# Patient Record
Sex: MALE | Race: WHITE | URBAN - METROPOLITAN AREA
[De-identification: names, ages, dates, MRNs, and addresses within clinical notes are randomized per-mention and may not be internally consistent; named-entity substitution may affect disease eponyms.]

---

## 2018-12-06 ENCOUNTER — IMPORTED ENCOUNTER (OUTPATIENT)
Dept: URBAN - METROPOLITAN AREA CLINIC 1 | Facility: CLINIC | Age: 68
End: 2018-12-06

## 2018-12-06 PROBLEM — H52.4: Noted: 2018-12-06

## 2018-12-06 PROCEDURE — S0620 ROUTINE OPHTHALMOLOGICAL EXA: HCPCS

## 2018-12-06 NOTE — PATIENT DISCUSSION
1.  Presbyopia: Rx was given for corrective spectacles if indicated. 2.  Cataract OUReturn for an appointment in 1 year 36 with Dr. Zahra Powell.

## 2019-01-14 ENCOUNTER — IMPORTED ENCOUNTER (OUTPATIENT)
Dept: URBAN - METROPOLITAN AREA CLINIC 1 | Facility: CLINIC | Age: 69
End: 2019-01-14

## 2019-11-19 ENCOUNTER — IMPORTED ENCOUNTER (OUTPATIENT)
Dept: URBAN - METROPOLITAN AREA CLINIC 1 | Facility: CLINIC | Age: 69
End: 2019-11-19

## 2019-11-19 PROBLEM — H52.4: Noted: 2019-11-19

## 2019-11-19 PROBLEM — H52.223: Noted: 2019-11-19

## 2019-11-19 PROBLEM — H52.13: Noted: 2019-11-19

## 2019-11-19 PROCEDURE — S0621 ROUTINE OPHTHALMOLOGICAL EXA: HCPCS

## 2019-11-19 NOTE — PATIENT DISCUSSION
1. Myopia / Astigmatism / Presbyopia OU -- Finalized Glasses MRx was given to patient today for correction if indicated and requested. 2. Cataracts OU -- Observe. Return for an appointment in 1 YR for a 36 OU with Dr. Marla Ross. Return for an appointment in 6 MO for a 30 OU with Dr. Marla Ross.

## 2020-05-19 ENCOUNTER — IMPORTED ENCOUNTER (OUTPATIENT)
Dept: URBAN - METROPOLITAN AREA CLINIC 1 | Facility: CLINIC | Age: 70
End: 2020-05-19

## 2020-05-19 PROBLEM — H25.813: Noted: 2020-05-19

## 2020-05-19 PROCEDURE — 92014 COMPRE OPH EXAM EST PT 1/>: CPT

## 2020-05-19 NOTE — PATIENT DISCUSSION
1.  Cataract OU -- Observe for now without intervention. The patient was advised to contact us if any change or worsening of vision. Return for an appointment as scheduled with Dr. Natalia Santos.

## 2020-06-05 ENCOUNTER — IMPORTED ENCOUNTER (OUTPATIENT)
Dept: URBAN - METROPOLITAN AREA CLINIC 1 | Facility: CLINIC | Age: 70
End: 2020-06-05

## 2020-06-05 PROBLEM — H04.123: Noted: 2020-06-05

## 2020-06-05 PROCEDURE — 92012 INTRM OPH EXAM EST PATIENT: CPT

## 2020-06-05 NOTE — PATIENT DISCUSSION
1.  Dry Eyes OU - Recommend ATs QID OU x 1 week then decrease to TID OU routinely 2. Cataract OU - ObserveReturn for an appointment for Return as scheduled 36 with Dr. Andrew Clemons.

## 2020-11-19 ENCOUNTER — IMPORTED ENCOUNTER (OUTPATIENT)
Dept: URBAN - METROPOLITAN AREA CLINIC 1 | Facility: CLINIC | Age: 70
End: 2020-11-19

## 2020-11-19 PROBLEM — H52.223: Noted: 2020-11-19

## 2020-11-19 PROBLEM — H52.31: Noted: 2020-11-19

## 2020-11-19 PROBLEM — H52.13: Noted: 2020-11-19

## 2020-11-19 PROBLEM — H52.4: Noted: 2020-11-19

## 2020-11-19 PROCEDURE — S0621 ROUTINE OPHTHALMOLOGICAL EXA: HCPCS

## 2020-11-19 NOTE — PATIENT DISCUSSION
1.  Anisometropia OU: Rx was given for correction if indicated and requested. 2. Astigmatism OU3. Presbyopia4. Dry Eyes OU - Recommend ATs QID OU x 1 week then decrease to TID OU routinely (sample given of Systane (11/19/2020) 5. Cataract OU - ObserveReturn for an appointment in 6 mo 30 with Dr. Cristofer Mclaughlin.

## 2021-05-19 ENCOUNTER — IMPORTED ENCOUNTER (OUTPATIENT)
Dept: URBAN - METROPOLITAN AREA CLINIC 1 | Facility: CLINIC | Age: 71
End: 2021-05-19

## 2021-05-19 PROBLEM — H04.123: Noted: 2021-05-19

## 2021-05-19 PROBLEM — H16.143: Noted: 2021-05-19

## 2021-05-19 PROBLEM — H25.813: Noted: 2021-05-19

## 2021-05-19 PROCEDURE — 99214 OFFICE O/P EST MOD 30 MIN: CPT

## 2021-05-19 NOTE — PATIENT DISCUSSION
1.  Cataract OU: Observe for now without intervention. The patient was advised to contact us if any change or worsening of vision2. LASHONDA w/ PEK OU- Recommend ATs BID-TID OU routinely (sample of Systane Balance given)Return for an appointment for Return as scheduled 40 with Dr. Miguel Weldon.

## 2021-10-20 ENCOUNTER — IMPORTED ENCOUNTER (OUTPATIENT)
Dept: URBAN - METROPOLITAN AREA CLINIC 1 | Facility: CLINIC | Age: 71
End: 2021-10-20

## 2021-10-20 PROBLEM — H52.4: Noted: 2021-10-20

## 2021-10-20 PROBLEM — H52.11: Noted: 2021-10-20

## 2021-10-20 PROBLEM — H52.223: Noted: 2021-10-20

## 2021-10-20 PROCEDURE — S0621 ROUTINE OPHTHALMOLOGICAL EXA: HCPCS

## 2021-10-20 NOTE — PATIENT DISCUSSION
1. Myopia OD w/ Astigmatism OU -- Rx was given for correction if indicated and requested. 2. Presbyopia 3. Cataract OU -- Observe. 4.  LASHONDA w/ PEK OU -- Cont ATs TID OU routinely. 5.  Vitreous Floaters OU -- RD Precautions. Return for an appointment in 6 months 30/Glare with Dr. Hosea Granger. Return for an appointment in 1 year 36 with Dr. Hosea Granger.

## 2022-04-02 ASSESSMENT — VISUAL ACUITY
OS_SC: 20/20
OS_SC: 20/20
OS_GLARE: 20/50
OD_SC: 20/20
OS_SC: 20/20
OS_SC: 20/20
OS_GLARE: 20/50
OD_SC: 20/20
OD_SC: 20/25
OS_SC: 20/20
OD_SC: 20/20
OD_SC: 20/25
OS_CC: J1
OD_CC: J1+
OS_CC: J1+
OD_GLARE: 20/50
OD_SC: 20/20
OS_SC: 20/25-1
OD_GLARE: 20/50
OS_CC: J1
OS_SC: 20/25
OS_SC: 20/20-1
OD_CC: J2
OS_CC: J2
OS_CC: J1
OD_SC: 20/20
OD_SC: 20/20
OD_CC: J1
OD_CC: J1+
OS_CC: J1+
OD_CC: J1
OD_CC: J1

## 2022-04-02 ASSESSMENT — TONOMETRY
OD_IOP_MMHG: 16
OS_IOP_MMHG: 19
OD_IOP_MMHG: 18
OD_IOP_MMHG: 19
OS_IOP_MMHG: 18
OD_IOP_MMHG: 19
OS_IOP_MMHG: 18
OS_IOP_MMHG: 17
OS_IOP_MMHG: 19
OD_IOP_MMHG: 17
OS_IOP_MMHG: 17
OS_IOP_MMHG: 17
OD_IOP_MMHG: 18
OD_IOP_MMHG: 17

## 2022-04-02 ASSESSMENT — KERATOMETRY
OS_AXISANGLE2_DEGREES: 101
OS_K1POWER_DIOPTERS: 45.25
OD_K2POWER_DIOPTERS: 44.25
OD_K1POWER_DIOPTERS: 45.00
OS_K2POWER_DIOPTERS: 44.25
OS_AXISANGLE_DEGREES: 011
OD_AXISANGLE_DEGREES: 013
OD_AXISANGLE2_DEGREES: 103

## 2022-04-20 ENCOUNTER — COMPREHENSIVE EXAM (OUTPATIENT)
Dept: URBAN - METROPOLITAN AREA CLINIC 1 | Facility: CLINIC | Age: 72
End: 2022-04-20

## 2022-04-20 DIAGNOSIS — H16.143: ICD-10-CM

## 2022-04-20 DIAGNOSIS — H04.123: ICD-10-CM

## 2022-04-20 DIAGNOSIS — H25.813: ICD-10-CM

## 2022-04-20 PROCEDURE — 99214 OFFICE O/P EST MOD 30 MIN: CPT

## 2022-04-20 ASSESSMENT — VISUAL ACUITY
OD_CC: J2
OD_CC: 20/25
OS_CC: J2
OS_BAT: 20/60
OS_CC: 20/25
OD_BAT: 20/60

## 2022-04-20 ASSESSMENT — TONOMETRY
OS_IOP_MMHG: 17
OD_IOP_MMHG: 17

## 2022-04-20 NOTE — PATIENT DISCUSSION
Visually significant, however patient wishes to hold off on surgery at this time. Observe for now without intervention. The patient was advised to contact office with any change or worsening of vision.

## 2022-10-31 ENCOUNTER — COMPREHENSIVE EXAM (OUTPATIENT)
Dept: URBAN - METROPOLITAN AREA CLINIC 1 | Facility: CLINIC | Age: 72
End: 2022-10-31

## 2022-10-31 DIAGNOSIS — H52.223: ICD-10-CM

## 2022-10-31 DIAGNOSIS — H52.02: ICD-10-CM

## 2022-10-31 DIAGNOSIS — H52.11: ICD-10-CM

## 2022-10-31 DIAGNOSIS — H52.4: ICD-10-CM

## 2022-10-31 PROCEDURE — 92014 COMPRE OPH EXAM EST PT 1/>: CPT

## 2022-10-31 PROCEDURE — 92015 DETERMINE REFRACTIVE STATE: CPT

## 2022-10-31 ASSESSMENT — TONOMETRY
OS_IOP_MMHG: 16
OD_IOP_MMHG: 16

## 2022-10-31 ASSESSMENT — VISUAL ACUITY
OS_CC: J1
OD_BAT: 20/80
OS_CC: 20/25-1
OD_CC: 20/20
OS_BAT: 20/80
OD_CC: J1

## 2022-10-31 NOTE — PATIENT DISCUSSION
Visually significant, however patient wishes to hold off on surgery at this time. Observe for now without intervention. The patient was advised to contact office with any change or worsening of vision. Will f/u in 6 months 3-0.

## 2023-05-03 ENCOUNTER — COMPREHENSIVE EXAM (OUTPATIENT)
Dept: URBAN - METROPOLITAN AREA CLINIC 1 | Facility: CLINIC | Age: 73
End: 2023-05-03

## 2023-05-03 DIAGNOSIS — H16.143: ICD-10-CM

## 2023-05-03 DIAGNOSIS — H04.123: ICD-10-CM

## 2023-05-03 DIAGNOSIS — H25.813: ICD-10-CM

## 2023-05-03 PROCEDURE — 99214 OFFICE O/P EST MOD 30 MIN: CPT

## 2023-05-03 ASSESSMENT — TONOMETRY
OS_IOP_MMHG: 17
OD_IOP_MMHG: 18

## 2023-05-03 ASSESSMENT — VISUAL ACUITY
OS_CC: 20/20-2
OD_BAT: 20/100
OD_CC: 20/20-2
OS_CC: J1
OD_CC: J1
OS_BAT: 20/100

## 2023-11-01 ENCOUNTER — COMPREHENSIVE EXAM (OUTPATIENT)
Dept: URBAN - METROPOLITAN AREA CLINIC 1 | Facility: CLINIC | Age: 73
End: 2023-11-01

## 2023-11-01 DIAGNOSIS — H52.223: ICD-10-CM

## 2023-11-01 DIAGNOSIS — H52.11: ICD-10-CM

## 2023-11-01 DIAGNOSIS — H52.02: ICD-10-CM

## 2023-11-01 DIAGNOSIS — Z01.00: ICD-10-CM

## 2023-11-01 DIAGNOSIS — H52.4: ICD-10-CM

## 2023-11-01 PROCEDURE — 92014 COMPRE OPH EXAM EST PT 1/>: CPT

## 2023-11-01 PROCEDURE — 92015 DETERMINE REFRACTIVE STATE: CPT

## 2023-11-01 ASSESSMENT — VISUAL ACUITY
OS_CC: J1
OD_CC: 20/25
OS_BAT: 20/30
OD_BAT: 20/25
OD_CC: J1
OS_CC: 20/25

## 2023-11-01 ASSESSMENT — TONOMETRY
OD_IOP_MMHG: 17
OS_IOP_MMHG: 16

## 2024-05-07 ENCOUNTER — COMPREHENSIVE EXAM (OUTPATIENT)
Dept: URBAN - METROPOLITAN AREA CLINIC 1 | Facility: CLINIC | Age: 74
End: 2024-05-07

## 2024-05-07 DIAGNOSIS — H02.403: ICD-10-CM

## 2024-05-07 DIAGNOSIS — H04.123: ICD-10-CM

## 2024-05-07 DIAGNOSIS — H16.143: ICD-10-CM

## 2024-05-07 DIAGNOSIS — H25.813: ICD-10-CM

## 2024-05-07 PROCEDURE — 99214 OFFICE O/P EST MOD 30 MIN: CPT

## 2024-05-07 ASSESSMENT — VISUAL ACUITY
OD_CC: J1
OS_CC: J1
OS_BAT: 20/40
OD_BAT: 20/40
OD_CC: 20/20
OS_CC: 20/25

## 2024-05-07 ASSESSMENT — TONOMETRY
OS_IOP_MMHG: 18
OD_IOP_MMHG: 18

## 2024-11-06 ENCOUNTER — COMPREHENSIVE EXAM (OUTPATIENT)
Dept: URBAN - METROPOLITAN AREA CLINIC 1 | Facility: CLINIC | Age: 74
End: 2024-11-06

## 2024-11-06 DIAGNOSIS — H52.223: ICD-10-CM

## 2024-11-06 DIAGNOSIS — Z01.00: ICD-10-CM

## 2024-11-06 DIAGNOSIS — H52.11: ICD-10-CM

## 2024-11-06 DIAGNOSIS — H52.4: ICD-10-CM

## 2024-11-06 PROCEDURE — 92015 DETERMINE REFRACTIVE STATE: CPT

## 2024-11-06 PROCEDURE — 92014 COMPRE OPH EXAM EST PT 1/>: CPT

## 2025-01-07 ENCOUNTER — CONSULTATION/EVALUATION (OUTPATIENT)
Age: 75
End: 2025-01-07

## 2025-01-07 VITALS
DIASTOLIC BLOOD PRESSURE: 86 MMHG | SYSTOLIC BLOOD PRESSURE: 143 MMHG | BODY MASS INDEX: 25.77 KG/M2 | HEIGHT: 69 IN | HEART RATE: 82 BPM | WEIGHT: 174 LBS

## 2025-01-07 DIAGNOSIS — H25.813: ICD-10-CM

## 2025-01-07 PROCEDURE — 92136 OPHTHALMIC BIOMETRY: CPT

## 2025-01-07 PROCEDURE — 99213 OFFICE O/P EST LOW 20 MIN: CPT

## 2025-01-07 PROCEDURE — 92025 CPTRIZED CORNEAL TOPOGRAPHY: CPT | Mod: NC
